# Patient Record
Sex: MALE | Race: BLACK OR AFRICAN AMERICAN | Employment: FULL TIME | URBAN - NONMETROPOLITAN AREA
[De-identification: names, ages, dates, MRNs, and addresses within clinical notes are randomized per-mention and may not be internally consistent; named-entity substitution may affect disease eponyms.]

---

## 2021-03-23 ENCOUNTER — APPOINTMENT (OUTPATIENT)
Dept: GENERAL RADIOLOGY | Age: 46
End: 2021-03-23
Attending: EMERGENCY MEDICINE
Payer: COMMERCIAL

## 2021-03-23 ENCOUNTER — HOSPITAL ENCOUNTER (EMERGENCY)
Age: 46
Discharge: HOME | End: 2021-03-23
Attending: EMERGENCY MEDICINE
Payer: COMMERCIAL

## 2021-03-23 VITALS
HEIGHT: 73 IN | OXYGEN SATURATION: 93 % | SYSTOLIC BLOOD PRESSURE: 124 MMHG | HEART RATE: 84 BPM | DIASTOLIC BLOOD PRESSURE: 75 MMHG | RESPIRATION RATE: 16 BRPM | WEIGHT: 315 LBS | BODY MASS INDEX: 41.75 KG/M2 | TEMPERATURE: 98 F

## 2021-03-23 VITALS
RESPIRATION RATE: 21 BRPM | BODY MASS INDEX: 41.75 KG/M2 | DIASTOLIC BLOOD PRESSURE: 83 MMHG | HEIGHT: 73 IN | TEMPERATURE: 98.3 F | OXYGEN SATURATION: 93 % | WEIGHT: 315 LBS | SYSTOLIC BLOOD PRESSURE: 142 MMHG | HEART RATE: 93 BPM

## 2021-03-23 DIAGNOSIS — S43.004A CLOSED DISLOCATION OF RIGHT SHOULDER, INITIAL ENCOUNTER: Primary | ICD-10-CM

## 2021-03-23 DIAGNOSIS — M25.511 PAIN IN JOINT OF RIGHT SHOULDER: Primary | ICD-10-CM

## 2021-03-23 PROCEDURE — 73030 X-RAY EXAM OF SHOULDER: CPT

## 2021-03-23 PROCEDURE — 99285 EMERGENCY DEPT VISIT HI MDM: CPT

## 2021-03-23 PROCEDURE — 73020 X-RAY EXAM OF SHOULDER: CPT

## 2021-03-23 PROCEDURE — 74011250636 HC RX REV CODE- 250/636: Performed by: EMERGENCY MEDICINE

## 2021-03-23 PROCEDURE — 96375 TX/PRO/DX INJ NEW DRUG ADDON: CPT

## 2021-03-23 PROCEDURE — 99283 EMERGENCY DEPT VISIT LOW MDM: CPT

## 2021-03-23 PROCEDURE — 74011000250 HC RX REV CODE- 250: Performed by: EMERGENCY MEDICINE

## 2021-03-23 PROCEDURE — 96374 THER/PROPH/DIAG INJ IV PUSH: CPT

## 2021-03-23 RX ORDER — TELMISARTAN 80 MG/1
80 TABLET ORAL DAILY
COMMUNITY

## 2021-03-23 RX ORDER — HYDROCHLOROTHIAZIDE 25 MG/1
25 TABLET ORAL DAILY
COMMUNITY

## 2021-03-23 RX ORDER — ONDANSETRON 2 MG/ML
4 INJECTION INTRAMUSCULAR; INTRAVENOUS
Status: COMPLETED | OUTPATIENT
Start: 2021-03-23 | End: 2021-03-23

## 2021-03-23 RX ORDER — KETAMINE HYDROCHLORIDE 10 MG/ML
100 INJECTION, SOLUTION INTRAMUSCULAR; INTRAVENOUS ONCE
Status: COMPLETED | OUTPATIENT
Start: 2021-03-23 | End: 2021-03-23

## 2021-03-23 RX ORDER — PROPOFOL 10 MG/ML
100 INJECTION, EMULSION INTRAVENOUS
Status: COMPLETED | OUTPATIENT
Start: 2021-03-23 | End: 2021-03-23

## 2021-03-23 RX ORDER — GUAIFENESIN 100 MG/5ML
81 LIQUID (ML) ORAL DAILY
COMMUNITY
End: 2021-05-25 | Stop reason: SDUPTHER

## 2021-03-23 RX ORDER — AMLODIPINE BESYLATE 5 MG/1
10 TABLET ORAL DAILY
COMMUNITY
End: 2021-05-25 | Stop reason: SDUPTHER

## 2021-03-23 RX ORDER — IBUPROFEN 800 MG/1
800 TABLET ORAL
Qty: 20 TAB | Refills: 0 | Status: SHIPPED | OUTPATIENT
Start: 2021-03-23 | End: 2021-03-30

## 2021-03-23 RX ORDER — NEBIVOLOL 5 MG/1
20 TABLET ORAL DAILY
COMMUNITY

## 2021-03-23 RX ORDER — FENTANYL CITRATE 50 UG/ML
100 INJECTION, SOLUTION INTRAMUSCULAR; INTRAVENOUS
Status: COMPLETED | OUTPATIENT
Start: 2021-03-23 | End: 2021-03-23

## 2021-03-23 RX ADMIN — FENTANYL CITRATE 100 MCG: 50 INJECTION, SOLUTION INTRAMUSCULAR; INTRAVENOUS at 04:46

## 2021-03-23 RX ADMIN — KETAMINE HYDROCHLORIDE 100 MG: 10 INJECTION INTRAMUSCULAR; INTRAVENOUS at 05:38

## 2021-03-23 RX ADMIN — PROPOFOL 100 MG: 10 INJECTION, EMULSION INTRAVENOUS at 05:38

## 2021-03-23 RX ADMIN — ONDANSETRON 4 MG: 2 INJECTION INTRAMUSCULAR; INTRAVENOUS at 04:46

## 2021-03-23 NOTE — ED PROVIDER NOTES
Patient presents for evaluation after falling over guardrail landing on his stomach and right shoulder. He reports severe right shoulder pain, worse with palpation or movement. No other complaints. Duration:  40 minutes    Intensity: severe    Modified by: palpationor movement. Past Medical History:   Diagnosis Date    Hypertension        History reviewed. No pertinent surgical history. History reviewed. No pertinent family history. Social History     Socioeconomic History    Marital status: SINGLE     Spouse name: Not on file    Number of children: Not on file    Years of education: Not on file    Highest education level: Not on file   Occupational History    Not on file   Social Needs    Financial resource strain: Not on file    Food insecurity     Worry: Not on file     Inability: Not on file    Transportation needs     Medical: Not on file     Non-medical: Not on file   Tobacco Use    Smoking status: Never Smoker    Smokeless tobacco: Never Used   Substance and Sexual Activity    Alcohol use: Never     Frequency: Never    Drug use: Never    Sexual activity: Not on file   Lifestyle    Physical activity     Days per week: Not on file     Minutes per session: Not on file    Stress: Not on file   Relationships    Social connections     Talks on phone: Not on file     Gets together: Not on file     Attends L[employer] service: Not on file     Active member of club or organization: Not on file     Attends meetings of clubs or organizations: Not on file     Relationship status: Not on file    Intimate partner violence     Fear of current or ex partner: Not on file     Emotionally abused: Not on file     Physically abused: Not on file     Forced sexual activity: Not on file   Other Topics Concern    Not on file   Social History Narrative    Not on file         ALLERGIES: Patient has no known allergies. Review of Systems   Constitutional: Negative. HENT: Negative. Eyes: Negative. Respiratory: Negative. Cardiovascular: Negative. Endocrine: Negative. Genitourinary: Negative. Musculoskeletal: Positive for arthralgias. Right shoulder pain   Skin: Negative. Allergic/Immunologic: Negative. Neurological: Negative. Hematological: Negative. Psychiatric/Behavioral: Negative. Vitals:    03/23/21 0436   BP: (!) 138/99   Pulse: (!) 108   Resp: 18   SpO2: 99%            Physical Exam  Vitals signs and nursing note reviewed. HENT:      Head: Normocephalic and atraumatic. Nose: Nose normal.   Neck:      Musculoskeletal: Normal range of motion and neck supple. Cardiovascular:      Rate and Rhythm: Normal rate and regular rhythm. Pulses: Normal pulses. Heart sounds: Normal heart sounds. Pulmonary:      Effort: Pulmonary effort is normal.      Breath sounds: Normal breath sounds. Abdominal:      General: Abdomen is flat. Bowel sounds are normal.      Palpations: Abdomen is soft. Musculoskeletal:         General: Tenderness present. No swelling. Right shoulder: He exhibits decreased range of motion and tenderness. He exhibits no crepitus. Skin:     General: Skin is warm and dry. Neurological:      General: No focal deficit present. Mental Status: He is alert and oriented to person, place, and time. Mental status is at baseline. Psychiatric:         Mood and Affect: Mood normal.         Behavior: Behavior normal.          MDM       Procedural Sedation    Date/Time: 3/23/2021 5:46 AM  Performed by: Kenya Gerard MD  Authorized by: Kenya Gerard MD     Consent:     Consent obtained:  Verbal and written    Consent given by:  Patient    Risks discussed:   Allergic reaction, inadequate sedation, respiratory compromise necessitating ventilatory assistance and intubation and prolonged sedation necessitating reversal  Indications:     Procedure performed:  Dislocation reduction    Procedure necessitating sedation performed by:  Physician performing sedation    Intended level of sedation:  Moderate (conscious sedation)  Pre-sedation assessment:     Time since last food or drink:  Last night    ASA classification: class 1 - normal, healthy patient      Neck mobility: normal      Pre-sedation assessments completed and reviewed: airway patency, anesthesia/sedation history, cardiovascular function, mental status, pain level and respiratory function    Immediate pre-procedure details:     Reviewed: vital signs    Procedure details (see MAR for exact dosages):     Preoxygenation:  Nasal cannula    Intra-procedure monitoring:  Blood pressure monitoring, continuous capnometry and continuous pulse oximetry    Intra-procedure events: none      Sedation end time:  3/23/2021 5:50 AM  Post-procedure details:     Attendance: Constant attendance by certified staff until patient recovered      Recovery: Patient returned to pre-procedure baseline      Estimated blood loss (see I/O flowsheets): no      Post-sedation assessments completed and reviewed: airway patency and mental status      Patient tolerance: Tolerated well, no immediate complications  Comments:      Right shoulder posterior dislocation reduced with traction. No complications.  Normal post reduction x ray

## 2021-03-23 NOTE — ED TRIAGE NOTES
Patient seen last noc for shoulder dislocation. This am patient states it popped out when trying to shower; then popped back in upon arrival to ED. Wants to be certain it's back in place. Dressing: bandage

## 2021-03-23 NOTE — ED NOTES
Procedure started at this time, pt on cardiac monitor w/ waveform capnography. Pt on [email or web address] via N/C. Dr. Juan Alcala RN, bUaldo Vanessa RN, and RT Chidi at bedside. Pt in no acute distress at this time.

## 2021-03-23 NOTE — ED NOTES
Pt is awake and alert at this time, pt has no complaints of pain. Pt sats 96-99% on room air. Vitals remain stable, pt answering all questions appropriately. Will continue to monitor.

## 2021-03-23 NOTE — ED PROVIDER NOTES
EMERGENCY DEPARTMENT HISTORY AND PHYSICAL EXAM      Date: 3/23/2021  Patient Name: Janak Calderon.    History of Presenting Illness     Chief Complaint   Patient presents with    Shoulder Injury       History Provided By: Patient    HPI: Janak Calderon., 50 y.o. male with a past medical history significant hypertension presents to the ED with cc of possible shoulder dislocation after reduction earlier this morning; patient states he was about the shower he abducted his arm slightly and he felt increased pain to right shoulder, patient got into the car drove to our facility and when he got here he felt the pain reduced when he moved the shoulder again; patient here to check to make sure he has not dislocated the shoulder again    There are no other complaints, changes, or physical findings at this time. PCP: UNKNOWN    Current Facility-Administered Medications on File Prior to Encounter   Medication Dose Route Frequency Provider Last Rate Last Admin    [COMPLETED] fentaNYL citrate (PF) injection 100 mcg  100 mcg IntraVENous NOW Chay Pham MD   100 mcg at 03/23/21 0446    [COMPLETED] ondansetron (ZOFRAN) injection 4 mg  4 mg IntraVENous NOW Chay Pham MD   4 mg at 03/23/21 0446    [COMPLETED] ketamine (KETALAR) 10 mg/mL injection 100 mg  100 mg IntraVENous Ann Miguel MD   100 mg at 03/23/21 0538    [COMPLETED] propofoL (DIPRIVAN) 10 mg/mL injection 100 mg  100 mg IntraVENous NOW Chay Pham MD   100 mg at 03/23/21 0154     Current Outpatient Medications on File Prior to Encounter   Medication Sig Dispense Refill    nebivoloL (BYSTOLIC) 5 mg tablet Take 20 mg by mouth daily.  amLODIPine (NORVASC) 5 mg tablet Take 10 mg by mouth daily.  telmisartan (MICARDIS) 80 mg tablet Take 80 mg by mouth daily.  hydroCHLOROthiazide (HYDRODIURIL) 25 mg tablet Take 25 mg by mouth daily.  aspirin 81 mg chewable tablet Take 81 mg by mouth daily.       ibuprofen (MOTRIN) 800 mg tablet Take 1 Tab by mouth every eight (8) hours as needed for Pain for up to 7 days. 20 Tab 0       Past History     Past Medical History:  Past Medical History:   Diagnosis Date    Hypertension        Past Surgical History:  History reviewed. No pertinent surgical history. Family History:  History reviewed. No pertinent family history. Social History:  Social History     Tobacco Use    Smoking status: Never Smoker    Smokeless tobacco: Never Used   Substance Use Topics    Alcohol use: Never     Frequency: Never    Drug use: Never       Allergies:  No Known Allergies      Review of Systems     Review of Systems   Constitutional: Negative for chills and fever. HENT: Negative for rhinorrhea and sore throat. Eyes: Negative for pain and visual disturbance. Respiratory: Negative for cough and shortness of breath. Cardiovascular: Negative for chest pain and leg swelling. Gastrointestinal: Negative for abdominal pain and vomiting. Endocrine: Negative for polydipsia and polyuria. Genitourinary: Negative for dysuria and urgency. Musculoskeletal: Negative for back pain and neck pain. Skin: Negative for color change and pallor. Neurological: Negative for weakness and numbness. Psychiatric/Behavioral: Negative. Physical Exam     Physical Exam  Vitals signs and nursing note reviewed. Constitutional:       Appearance: Normal appearance. HENT:      Head: Normocephalic and atraumatic. Mouth/Throat:      Mouth: Mucous membranes are dry. Pharynx: Oropharynx is clear. Eyes:      Extraocular Movements: Extraocular movements intact. Conjunctiva/sclera: Conjunctivae normal.      Pupils: Pupils are equal, round, and reactive to light. Neck:      Musculoskeletal: Normal range of motion and neck supple. Cardiovascular:      Rate and Rhythm: Normal rate and regular rhythm. Pulses: Normal pulses. Heart sounds: Normal heart sounds.    Pulmonary: Effort: Pulmonary effort is normal.      Breath sounds: Normal breath sounds. Abdominal:      General: Bowel sounds are normal.      Palpations: Abdomen is soft. Musculoskeletal: Normal range of motion. General: No swelling, tenderness, deformity or signs of injury. Skin:     General: Skin is warm and dry. Capillary Refill: Capillary refill takes less than 2 seconds. Neurological:      General: No focal deficit present. Mental Status: He is alert and oriented to person, place, and time. Psychiatric:         Mood and Affect: Mood normal.         Behavior: Behavior normal.         Lab and Diagnostic Study Results     Labs -   No results found for this or any previous visit (from the past 12 hour(s)). Radiologic Studies -   @lastxrresult@  CT Results  (Last 48 hours)    None        CXR Results  (Last 48 hours)    None            Medical Decision Making   - I am the first provider for this patient. - I reviewed the vital signs, available nursing notes, past medical history, past surgical history, family history and social history. - Initial assessment performed. The patients presenting problems have been discussed, and they are in agreement with the care plan formulated and outlined with them. I have encouraged them to ask questions as they arise throughout their visit. Vital Signs-Reviewed the patient's vital signs. Patient Vitals for the past 12 hrs:   Temp Pulse Resp BP SpO2   03/23/21 0958  84 16 124/75 93 %   03/23/21 0843 98 °F (36.7 °C) 87 20 137/87 96 %       Records Reviewed: Nursing Notes    The patient presents with shoulder pain with a differential diagnosis of dislocation, fracture, contusion      ED Course:          Provider Notes (Medical Decision Making): MDM       Procedures   Medical Decision Makingedical Decision Making  Performed by:  Shahzad Carrasco MD  PROCEDURES:  Procedures       Disposition   Disposition: Condition stable and improved  DC- Adult Discharges: All of the diagnostic tests were reviewed and questions answered. Diagnosis, care plan and treatment options were discussed. The patient understands the instructions and will follow up as directed. The patients results have been reviewed with them. They have been counseled regarding their diagnosis. The patient verbally convey understanding and agreement of the signs, symptoms, diagnosis, treatment and prognosis and additionally agrees to follow up as recommended with their PCP in 24 - 48 hours. They also agree with the care-plan and convey that all of their questions have been answered. I have also put together some discharge instructions for them that include: 1) educational information regarding their diagnosis, 2) how to care for their diagnosis at home, as well a 3) list of reasons why they would want to return to the ED prior to their follow-up appointment, should their condition change. Discharged    DISCHARGE PLAN:  1. Current Discharge Medication List      CONTINUE these medications which have NOT CHANGED    Details   nebivoloL (BYSTOLIC) 5 mg tablet Take 20 mg by mouth daily. amLODIPine (NORVASC) 5 mg tablet Take 10 mg by mouth daily. telmisartan (MICARDIS) 80 mg tablet Take 80 mg by mouth daily. hydroCHLOROthiazide (HYDRODIURIL) 25 mg tablet Take 25 mg by mouth daily. aspirin 81 mg chewable tablet Take 81 mg by mouth daily. ibuprofen (MOTRIN) 800 mg tablet Take 1 Tab by mouth every eight (8) hours as needed for Pain for up to 7 days. Qty: 20 Tab, Refills: 0           2. Follow-up Information     Follow up With Specialties Details Why Contact Info    Kinsey Alcala MD Orthopedic Surgery Schedule an appointment as soon as possible for a visit in 1 day  [address]  [place] [address]  [phone]          3. Return to ED if worse   4. Current Discharge Medication List            Diagnosis     Clinical Impression:   1.  Pain in joint of right shoulder        Attestations:    Meggan Johnson MD    Please note that this dictation was completed with [employer], the computer voice recognition software. Quite often unanticipated grammatical, syntax, homophones, and other interpretive errors are inadvertently transcribed by the computer software. Please disregard these errors. Please excuse any errors that have escaped final proofreading. Thank you.

## 2021-03-25 ENCOUNTER — OFFICE VISIT (OUTPATIENT)
Dept: ORTHOPEDIC SURGERY | Age: 46
End: 2021-03-25
Payer: COMMERCIAL

## 2021-03-25 VITALS — HEIGHT: 73 IN | WEIGHT: 315 LBS | BODY MASS INDEX: 41.75 KG/M2

## 2021-03-25 DIAGNOSIS — M25.511 RIGHT SHOULDER PAIN, UNSPECIFIED CHRONICITY: Primary | ICD-10-CM

## 2021-03-25 PROCEDURE — 99203 OFFICE O/P NEW LOW 30 MIN: CPT | Performed by: ORTHOPAEDIC SURGERY

## 2021-03-25 NOTE — PROGRESS NOTES
Name: Elodia Al Sr.    : 1975     Service Dept: [address] [place] [address] and Sports Medicine    Patient's Pharmacies:    [facility]/pharmacy #[id number] - [place]  [address] [place] [id number]  Phone: [phone] Fax: [phone]       Chief Complaint   Patient presents with    Shoulder Pain        Visit Vitals  Ht 6' 1\" (1.854 m)   Wt 315 lb (142.9 kg)   BMI 41.56 kg/m²      No Known Allergies   Current Outpatient Medications   Medication Sig Dispense Refill    nebivoloL (BYSTOLIC) 5 mg tablet Take 20 mg by mouth daily.  amLODIPine (NORVASC) 5 mg tablet Take 10 mg by mouth daily.  telmisartan (MICARDIS) 80 mg tablet Take 80 mg by mouth daily.  hydroCHLOROthiazide (HYDRODIURIL) 25 mg tablet Take 25 mg by mouth daily.  aspirin 81 mg chewable tablet Take 81 mg by mouth daily.  ibuprofen (MOTRIN) 800 mg tablet Take 1 Tab by mouth every eight (8) hours as needed for Pain for up to 7 days. 20 Tab 0      There is no problem list on file for this patient. Family History   Problem Relation Age of Onset    No Known Problems Mother     Diabetes Father       Social History     Socioeconomic History    Marital status:      Spouse name: Not on file    Number of children: Not on file    Years of education: Not on file    Highest education level: Not on file   Tobacco Use    Smoking status: Never Smoker    Smokeless tobacco: Never Used   Substance and Sexual Activity    Alcohol use: Never     Frequency: Never    Drug use: Never    Sexual activity: Not Currently      History reviewed. No pertinent surgical history. Past Medical History:   Diagnosis Date    Hypertension         I have reviewed and agree with [address] [place] [address] Nw and ROS and intake form in chart and the record furthermore I have reviewed prior medical record(s) regarding this patients care during this appointment.      Review of Systems:   Patient is a pleasant appearing individual, appropriately dressed, well hydrated, well nourished, who is alert, appropriately oriented for age, and in no acute distress with a normal gait and normal affect who does not appear to be in any significant pain. Physical Exam:  Right Shoulder - Positive \"empty can\" test, Grossly neurovascularly intact. Range of motion-Full passive, Active with impingement. No Point tenderness, Strength-significant weakness noted with abduction, some mild crepitation, No skin lesion are identified, No instabilty is noted, No apprehension. No Swelling. Left Shoulder - grossly neurovascularly intact. Full Range of motion, No Weakness with abduction, No Point Tenderness, No skin lesion are identified, No instabilty is noted, No apprehension. No cuts or abrasions are identified. Encounter Diagnoses     ICD-10-CM ICD-9-CM   1. Right shoulder pain, unspecified chronicity  M25.511 719.41       HPI:  The patient is here with a chief complaint of right shoulder pain, throbbing burning pain. He had a shoulder dislocation, was reduced. Pain is 6/10. ROS:  10-point review of systems is unremarkable. Assessment/Plan:  Plan at this point, my recommendation would be for ice, elevate, sling, no work, and we will get an MRI of the right shoulder. We will see him back post-MRI. As part of continued conservative pain management options the patient was advised to utilize Tylenol or OTC NSAIDS as long as it is not medically contraindicated. Return to Office: Follow-up and Dispositions    · Return for POST MRI. Scribed by Kellen Velez LPN as dictated by [facility] Sow MD.  Documentation True and Accepted Duke Sow MD

## 2021-03-25 NOTE — PATIENT INSTRUCTIONS
Shoulder Pain: Care Instructions Your Care Instructions You can hurt your shoulder by using it too much during an activity, such as fishing or baseball. It can also happen as part of the everyday wear and tear of getting older. Shoulder injuries can be slow to heal, but your shoulder should get better with time. Your doctor may recommend a sling to rest your shoulder. If you have injured your shoulder, you may need testing and treatment. Follow-up care is a key part of your treatment and safety. Be sure to make and go to all appointments, and call your doctor if you are having problems. It's also a good idea to know your test results and keep a list of the medicines you take. How can you care for yourself at home? · Take pain medicines exactly as directed. ? If the doctor gave you a prescription medicine for pain, take it as prescribed. ? If you are not taking a prescription pain medicine, ask your doctor if you can take an over-the-counter medicine. ? Do not take two or more pain medicines at the same time unless the doctor told you to. Many pain medicines contain acetaminophen, which is Tylenol. Too much acetaminophen (Tylenol) can be harmful. · If your doctor recommends that you wear a sling, use it as directed. Do not take it off before your doctor tells you to. · Put ice or a cold pack on the sore area for 10 to 20 minutes at a time. Put a thin cloth between the ice and your skin. · If there is no swelling, you can put moist heat, a heating pad, or a warm cloth on your shoulder. Some doctors suggest alternating between hot and cold. · Rest your shoulder for a few days. If your doctor recommends it, you can then begin gentle exercise of the shoulder, but do not lift anything heavy. When should you call for help? Call 911 anytime you think you may need emergency care. For example, call if: 
  · You have chest pain or pressure. This may occur with: ? Sweating. ? Shortness of breath.  
? Nausea or vomiting. ? Pain that spreads from the chest to the neck, jaw, or one or both shoulders or arms. ? Dizziness or lightheadedness. ? A fast or uneven pulse. After calling 911, chew 1 adult-strength aspirin. Wait for an ambulance. Do not try to drive yourself.  
  · Your arm or hand is cool or pale or changes color. Call your doctor now or seek immediate medical care if: 
  · You have signs of infection, such as: 
? Increased pain, swelling, warmth, or redness in your shoulder. ? Red streaks leading from a place on your shoulder. ? Pus draining from an area of your shoulder. ? Swollen lymph nodes in your neck, armpits, or groin. ? A fever. Watch closely for changes in your health, and be sure to contact your doctor if: 
  · You cannot use your shoulder.  
  · Your shoulder does not get better as expected. Where can you learn more? Go to http://www.gray.com/ Enter A542 in the search box to learn more about \"Shoulder Pain: Care Instructions. \" Current as of: March 2, 2020               Content Version: 12.6 © 5440-8531 [employer]. Care instructions adapted under license by [employer] (which disclaims liability or warranty for this information). If you have questions about a medical condition or this instruction, always ask your healthcare professional. [place] [address] any warranty or liability for your use of this information.

## 2021-03-25 NOTE — LETTER
NOTIFICATION RETURN TO WORK / SCHOOL 
 
3/25/2021 3:14 PM 
 
Mr. Santiago Garcia [address] [address] To Whom It May Concern: 
 
Jordi Hernandez Sr. is currently under the care of [address] [place], [place] [address]sun GONZALEZ. He is not able to return to work until seen post MRI. If there are questions or concerns please have the patient contact our office.  
 
 
 
Sincerely, 
 
 
Bess Rincon MD

## 2021-04-14 ENCOUNTER — HOSPITAL ENCOUNTER (OUTPATIENT)
Age: 46
Discharge: HOME | End: 2021-04-14
Payer: COMMERCIAL

## 2021-04-14 DIAGNOSIS — M25.511 RIGHT SHOULDER PAIN, UNSPECIFIED CHRONICITY: ICD-10-CM

## 2021-04-14 PROCEDURE — 73221 MRI JOINT UPR EXTREM W/O DYE: CPT

## 2021-04-20 ENCOUNTER — OFFICE VISIT (OUTPATIENT)
Dept: ORTHOPEDIC SURGERY | Age: 46
End: 2021-04-20
Payer: COMMERCIAL

## 2021-04-20 DIAGNOSIS — M25.511 RIGHT SHOULDER PAIN, UNSPECIFIED CHRONICITY: Primary | ICD-10-CM

## 2021-04-20 PROCEDURE — 99213 OFFICE O/P EST LOW 20 MIN: CPT | Performed by: ORTHOPAEDIC SURGERY

## 2021-04-20 NOTE — PATIENT INSTRUCTIONS
Shoulder Pain: Care Instructions Your Care Instructions You can hurt your shoulder by using it too much during an activity, such as fishing or baseball. It can also happen as part of the everyday wear and tear of getting older. Shoulder injuries can be slow to heal, but your shoulder should get better with time. Your doctor may recommend a sling to rest your shoulder. If you have injured your shoulder, you may need testing and treatment. Follow-up care is a key part of your treatment and safety. Be sure to make and go to all appointments, and call your doctor if you are having problems. It's also a good idea to know your test results and keep a list of the medicines you take. How can you care for yourself at home? · Take pain medicines exactly as directed. ? If the doctor gave you a prescription medicine for pain, take it as prescribed. ? If you are not taking a prescription pain medicine, ask your doctor if you can take an over-the-counter medicine. ? Do not take two or more pain medicines at the same time unless the doctor told you to. Many pain medicines contain acetaminophen, which is Tylenol. Too much acetaminophen (Tylenol) can be harmful. · If your doctor recommends that you wear a sling, use it as directed. Do not take it off before your doctor tells you to. · Put ice or a cold pack on the sore area for 10 to 20 minutes at a time. Put a thin cloth between the ice and your skin. · If there is no swelling, you can put moist heat, a heating pad, or a warm cloth on your shoulder. Some doctors suggest alternating between hot and cold. · Rest your shoulder for a few days. If your doctor recommends it, you can then begin gentle exercise of the shoulder, but do not lift anything heavy. When should you call for help? Call 911 anytime you think you may need emergency care. For example, call if: 
  · You have chest pain or pressure. This may occur with: ? Sweating. ? Shortness of breath.  
? Nausea or vomiting. ? Pain that spreads from the chest to the neck, jaw, or one or both shoulders or arms. ? Dizziness or lightheadedness. ? A fast or uneven pulse. After calling 911, chew 1 adult-strength aspirin. Wait for an ambulance. Do not try to drive yourself.  
  · Your arm or hand is cool or pale or changes color. Call your doctor now or seek immediate medical care if: 
  · You have signs of infection, such as: 
? Increased pain, swelling, warmth, or redness in your shoulder. ? Red streaks leading from a place on your shoulder. ? Pus draining from an area of your shoulder. ? Swollen lymph nodes in your neck, armpits, or groin. ? A fever. Watch closely for changes in your health, and be sure to contact your doctor if: 
  · You cannot use your shoulder.  
  · Your shoulder does not get better as expected. Where can you learn more? Go to http://www.gray.com/ Enter H630 in the search box to learn more about \"Shoulder Pain: Care Instructions. \" Current as of: November 16, 2020               Content Version: 12.8 © 9789-6081 [employer]. Care instructions adapted under license by [employer] (which disclaims liability or warranty for this information). If you have questions about a medical condition or this instruction, always ask your healthcare professional. [place] [address] any warranty or liability for your use of this information.

## 2021-04-20 NOTE — PROGRESS NOTES
Name: Edgard Rodriguez Sr.    : 1975     Service Dept: [address] [place] [address] and Sports Medicine    Patient's Pharmacies:    [facility]/pharmacy #[id number] - Philippe Sim  [address] [place] [address] [place] [address]  Phone: [phone] Fax: [phone]       Chief Complaint   Patient presents with    Shoulder Pain        There were no vitals taken for this visit. No Known Allergies   Current Outpatient Medications   Medication Sig Dispense Refill    nebivoloL (BYSTOLIC) 5 mg tablet Take 20 mg by mouth daily.  amLODIPine (NORVASC) 5 mg tablet Take 10 mg by mouth daily.  telmisartan (MICARDIS) 80 mg tablet Take 80 mg by mouth daily.  hydroCHLOROthiazide (HYDRODIURIL) 25 mg tablet Take 25 mg by mouth daily.  aspirin 81 mg chewable tablet Take 81 mg by mouth daily. There is no problem list on file for this patient. Family History   Problem Relation Age of Onset    No Known Problems Mother     Diabetes Father       Social History     Socioeconomic History    Marital status:      Spouse name: Not on file    Number of children: Not on file    Years of education: Not on file    Highest education level: Not on file   Tobacco Use    Smoking status: Never Smoker    Smokeless tobacco: Never Used   Substance and Sexual Activity    Alcohol use: Never     Frequency: Never    Drug use: Never    Sexual activity: Not Currently      History reviewed. No pertinent surgical history. Past Medical History:   Diagnosis Date    Hypertension         I have reviewed and agree with [address] [place] [address] Nw and ROS and intake form in chart and the record furthermore I have reviewed prior medical record(s) regarding this patients care during this appointment.      Review of Systems:   Patient is a pleasant appearing individual, appropriately dressed, well hydrated, well nourished, who is alert, appropriately oriented for age, and in no acute distress with a normal gait and normal affect who does not appear to be in any significant pain. Physical Exam:  Right Shoulder - Grossly neurovascularly intact. Range of motion-Full passive, Active with impingement, Anterior apprehension, No Point tenderness, Strength-weakness with abduction, palpable popping felt on exam, No skin lesion are identified, No instabilty is noted, positive apprehension. No Swelling. Left Shoulder - Grossly neurovascularly intact, Full Range of motion, No point tenderness, No weakness, No skin lesions, No Instability, No apprehension, No swelling. Encounter Diagnoses     ICD-10-CM ICD-9-CM   1. Right shoulder pain, unspecified chronicity  M25.511 719.41       HPI:  The patient is here with a chief complaint of right shoulder pain, sharp, throbbing pain. It is a lot better. Pain is  5/10. ROS:  10-point review of systems is positive for nighttime pain and stiffness. X-rays of the right shoulder are unremarkable. MRI is positive for large bony Bankart lesion. Continues to have difficulty with it. Assessment/Plan:  1. 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 Plan at this point, consider treatment. I will see the patient back in 2 weeks and at that point if he still continues to have difficulty, we may get a CAT scan to assess this bony Bankart and go from there. If it gets worse, he is to give me a call. As part of continued conservative pain management options the patient was advised to utilize Tylenol or OTC NSAIDS as long as it is not medically contraindicated. Return to Office: Follow-up and Dispositions    · Return in about 2 weeks (around 5/4/2021). Scribed by Tosha Hsu LPN as dictated by [facility] SUPA Manning MD.  Documentation True and Accepted Duke Manning MD

## 2021-04-20 NOTE — LETTER
NOTIFICATION RETURN TO WORK / SCHOOL 
 
4/20/2021 8:44 AM 
 
Mr. Santiago Garcia [address] [address] To Whom It May Concern: 
 
Kyra Christian Sr. is currently under the care of [address] [place] [address] LISA. He will remain out of work until further notice. If there are questions or concerns please have the patient contact our office.  
 
 
 
Sincerely, 
 
 
Lisa Craven MD

## 2021-05-04 ENCOUNTER — OFFICE VISIT (OUTPATIENT)
Dept: ORTHOPEDIC SURGERY | Age: 46
End: 2021-05-04
Payer: COMMERCIAL

## 2021-05-04 DIAGNOSIS — M25.511 RIGHT SHOULDER PAIN, UNSPECIFIED CHRONICITY: Primary | ICD-10-CM

## 2021-05-04 PROCEDURE — 99213 OFFICE O/P EST LOW 20 MIN: CPT | Performed by: ORTHOPAEDIC SURGERY

## 2021-05-04 NOTE — PATIENT INSTRUCTIONS
Shoulder Pain: Care Instructions Your Care Instructions You can hurt your shoulder by using it too much during an activity, such as fishing or baseball. It can also happen as part of the everyday wear and tear of getting older. Shoulder injuries can be slow to heal, but your shoulder should get better with time. Your doctor may recommend a sling to rest your shoulder. If you have injured your shoulder, you may need testing and treatment. Follow-up care is a key part of your treatment and safety. Be sure to make and go to all appointments, and call your doctor if you are having problems. It's also a good idea to know your test results and keep a list of the medicines you take. How can you care for yourself at home? · Take pain medicines exactly as directed. ? If the doctor gave you a prescription medicine for pain, take it as prescribed. ? If you are not taking a prescription pain medicine, ask your doctor if you can take an over-the-counter medicine. ? Do not take two or more pain medicines at the same time unless the doctor told you to. Many pain medicines contain acetaminophen, which is Tylenol. Too much acetaminophen (Tylenol) can be harmful. · If your doctor recommends that you wear a sling, use it as directed. Do not take it off before your doctor tells you to. · Put ice or a cold pack on the sore area for 10 to 20 minutes at a time. Put a thin cloth between the ice and your skin. · If there is no swelling, you can put moist heat, a heating pad, or a warm cloth on your shoulder. Some doctors suggest alternating between hot and cold. · Rest your shoulder for a few days. If your doctor recommends it, you can then begin gentle exercise of the shoulder, but do not lift anything heavy. When should you call for help? Call 911 anytime you think you may need emergency care. For example, call if: 
  · You have chest pain or pressure. This may occur with: ? Sweating. ? Shortness of breath.  
? Nausea or vomiting. ? Pain that spreads from the chest to the neck, jaw, or one or both shoulders or arms. ? Dizziness or lightheadedness. ? A fast or uneven pulse. After calling 911, chew 1 adult-strength aspirin. Wait for an ambulance. Do not try to drive yourself.  
  · Your arm or hand is cool or pale or changes color. Call your doctor now or seek immediate medical care if: 
  · You have signs of infection, such as: 
? Increased pain, swelling, warmth, or redness in your shoulder. ? Red streaks leading from a place on your shoulder. ? Pus draining from an area of your shoulder. ? Swollen lymph nodes in your neck, armpits, or groin. ? A fever. Watch closely for changes in your health, and be sure to contact your doctor if: 
  · You cannot use your shoulder.  
  · Your shoulder does not get better as expected. Where can you learn more? Go to http://www.gray.com/ Enter L820 in the search box to learn more about \"Shoulder Pain: Care Instructions. \" Current as of: November 16, 2020               Content Version: 12.8 © 7725-2626 [employer]. Care instructions adapted under license by [employer] (which disclaims liability or warranty for this information). If you have questions about a medical condition or this instruction, always ask your healthcare professional. [place] [address] any warranty or liability for your use of this information.

## 2021-05-04 NOTE — PROGRESS NOTES
Name: Anita Olivas Sr.    : 1975     Service Dept: [address] [place] [address] and Sports Medicine    Patient's Pharmacies:    [facility]/pharmacy #[id number] - You RodriguezPhilippe kruger  [address] [place] [address]  Phone: [phone] Fax: [phone]       Chief Complaint   Patient presents with    Shoulder Pain        There were no vitals taken for this visit. No Known Allergies   Current Outpatient Medications   Medication Sig Dispense Refill    nebivoloL (BYSTOLIC) 5 mg tablet Take 20 mg by mouth daily.  amLODIPine (NORVASC) 5 mg tablet Take 10 mg by mouth daily.  telmisartan (MICARDIS) 80 mg tablet Take 80 mg by mouth daily.  hydroCHLOROthiazide (HYDRODIURIL) 25 mg tablet Take 25 mg by mouth daily.  aspirin 81 mg chewable tablet Take 81 mg by mouth daily. There is no problem list on file for this patient. Family History   Problem Relation Age of Onset    No Known Problems Mother     Diabetes Father       Social History     Socioeconomic History    Marital status:      Spouse name: Not on file    Number of children: Not on file    Years of education: Not on file    Highest education level: Not on file   Tobacco Use    Smoking status: Never Smoker    Smokeless tobacco: Never Used   Substance and Sexual Activity    Alcohol use: Never     Frequency: Never    Drug use: Never    Sexual activity: Not Currently      History reviewed. No pertinent surgical history. Past Medical History:   Diagnosis Date    Hypertension         I have reviewed and agree with [address] [place] [address] Nw and ROS and intake form in chart and the record furthermore I have reviewed prior medical record(s) regarding this patients care during this appointment.      Review of Systems:   Patient is a pleasant appearing individual, appropriately dressed, well hydrated, well nourished, who is alert, appropriately oriented for age, and in no acute distress with a normal gait and normal affect who does not appear to be in any significant pain. Physical Exam:  Right Shoulder - Positive \"empty can\" test, Grossly neurovascularly intact. Range of motion-Full passive, Active with impingement. No Point tenderness, Strength-significant weakness noted with abduction, some mild crepitation, No skin lesion are identified, No instabilty is noted, No apprehension. No Swelling. Left Shoulder - grossly neurovascularly intact. Full Range of motion, No Weakness with abduction, No Point Tenderness, No skin lesion are identified, No instabilty is noted, No apprehension. No cuts or abrasions are identified. Encounter Diagnoses     ICD-10-CM ICD-9-CM   1. Right shoulder pain, unspecified chronicity  M25.511 719.41       HPI:  The patient is here with a chief complaint of right shoulder pain, status post shoulder dislocation. Post MRI shows that he has got a bony Bankart. No evidence of any rotator cuff tear. Continues to have little bit of difficulty, but not significantly painful. Assessment/Plan:  Plan at this point, my recommendation would be for ice, elevate, antiinflammatories. We will see him back in 3 to 4 weeks. We want to address his instability at some point if he continues to have it, but we are going to try just conservative treatment, see how he does, and go from there. If it gets worse, he is to give me a call. As part of continued conservative pain management options the patient was advised to utilize Tylenol or OTC NSAIDS as long as it is not medically contraindicated. Return to Office: Follow-up and Dispositions    · Return in about 3 weeks (around 5/25/2021). Scribed by Amber To LPN as dictated by [facility] SUPA Garcia MD.  Documentation True and Accepted Duke Garcia MD

## 2021-05-04 NOTE — LETTER
NOTIFICATION RETURN TO WORK / SCHOOL 
 
5/4/2021 8:36 AM 
 
Mr. Santiago Garcia [address] [address] To Whom It May Concern: 
 
Nicolas Ky Betancourt is currently under the care of [address] [place] [address] LISA. He will remain out of work until further notice. If there are questions or concerns please have the patient contact our office.  
 
 
 
Sincerely, 
 
 
Keny Brar MD

## 2021-05-25 ENCOUNTER — OFFICE VISIT (OUTPATIENT)
Dept: ORTHOPEDIC SURGERY | Age: 46
End: 2021-05-25
Payer: COMMERCIAL

## 2021-05-25 DIAGNOSIS — M25.511 RIGHT SHOULDER PAIN, UNSPECIFIED CHRONICITY: Primary | ICD-10-CM

## 2021-05-25 PROCEDURE — 99213 OFFICE O/P EST LOW 20 MIN: CPT | Performed by: ORTHOPAEDIC SURGERY

## 2021-05-25 RX ORDER — AMLODIPINE BESYLATE 10 MG/1
TABLET ORAL
COMMUNITY
Start: 2021-05-13

## 2021-05-25 RX ORDER — CELECOXIB 200 MG/1
CAPSULE ORAL
COMMUNITY
Start: 2021-05-24

## 2021-05-25 RX ORDER — ASPIRIN 81 MG/1
TABLET ORAL
COMMUNITY
Start: 2021-05-08

## 2021-05-25 RX ORDER — FLUTICASONE PROPIONATE 50 MCG
SPRAY, SUSPENSION (ML) NASAL
COMMUNITY
Start: 2021-05-13

## 2021-05-25 NOTE — PROGRESS NOTES
Name: Diania Pallas Sr.    : 1975     Service Dept: [address] [place] [address] and Sports Medicine    Patient's Pharmacies:    [facility]/pharmacy #[id number] - Teri KristianPhilippe atwood  [address] [place] [address]  Phone: [phone] Fax: [phone]       Chief Complaint   Patient presents with    Shoulder Pain        There were no vitals taken for this visit. No Known Allergies   Current Outpatient Medications   Medication Sig Dispense Refill    celecoxib (CELEBREX) 200 mg capsule       fluticasone propionate (FLONASE) 50 mcg/actuation nasal spray USE 1 SPRAY IN BOTH NOSTRILS TWICE A DAY      amLODIPine (NORVASC) 10 mg tablet 0NE TAB BY MOUTH EVERY AM FOR HIGH BLOOD PRESSURE   THIS IS A DOSE INCREASE      aspirin delayed-release 81 mg tablet TAKE 1 TABLET BY MOUTH EVERY DAY      nebivoloL (BYSTOLIC) 5 mg tablet Take 20 mg by mouth daily.  telmisartan (MICARDIS) 80 mg tablet Take 80 mg by mouth daily.  hydroCHLOROthiazide (HYDRODIURIL) 25 mg tablet Take 25 mg by mouth daily. There is no problem list on file for this patient.      Family History   Problem Relation Age of Onset    No Known Problems Mother     Diabetes Father       Social History     Socioeconomic History    Marital status:      Spouse name: Not on file    Number of children: Not on file    Years of education: Not on file    Highest education level: Not on file   Tobacco Use    Smoking status: Never Smoker    Smokeless tobacco: Never Used   Vaping Use    Vaping Use: Never used   Substance and Sexual Activity    Alcohol use: Never    Drug use: Never    Sexual activity: Not Currently     Social Determinants of Health     Financial Resource Strain:     Difficulty of Paying Living Expenses:    Food Insecurity:     Worried About Running Out of Food in the Last Year:     Radha of Food in the Last Year:    Transportation Needs:  Lack of Transportation (Medical):  Lack of Transportation (Non-Medical):    Physical Activity:     Days of Exercise per Week:     Minutes of Exercise per Session:    Stress:     Feeling of Stress :    Social Connections:     Frequency of Communication with Friends and Family:     Frequency of Social Gatherings with Friends and Family:     Attends Religion Services:     Active Member of Clubs or Organizations:     Attends Club or Organization Meetings:     Marital Status:       History reviewed. No pertinent surgical history. Past Medical History:   Diagnosis Date    Hypertension         I have reviewed and agree with [facility] N[address] and ROS and intake form in chart and the record furthermore I have reviewed prior medical record(s) regarding this patients care during this appointment. Review of Systems:   Patient is a pleasant appearing individual, appropriately dressed, well hydrated, well nourished, who is alert, appropriately oriented for age, and in no acute distress with a normal gait and normal affect who does not appear to be in any significant pain. Physical Exam:  Right Shoulder - Positive \"empty can\" test, Grossly neurovascularly intact. Range of motion-Full passive, Active with impingement. No Point tenderness, Strength-significant weakness noted with abduction, some mild crepitation, No skin lesion are identified, No instabilty is noted, No apprehension. No Swelling. Left Shoulder - grossly neurovascularly intact. Full Range of motion, No Weakness with abduction, No Point Tenderness, No skin lesion are identified, No instabilty is noted, No apprehension. No cuts or abrasions are identified. Encounter Diagnoses     ICD-10-CM ICD-9-CM   1. Right shoulder pain, unspecified chronicity  M25.511 719.41       HPI:  The patient is here with a chief complaint of right shoulder pain, sharp throbbing pain. It is a lot better. Pain is 6/10. ROS:  10-point review of systems is unremarkable. He is diagnosed with bony Bankart's. Assessment/Plan:  Plan at this point, we are going to go ahead and start physical therapy for him, see him back in 6 weeks, and return to work in 2 weeks and go from there. As part of continued conservative pain management options the patient was advised to utilize Tylenol or OTC NSAIDS as long as it is not medically contraindicated. Return to Office: Follow-up and Dispositions    · Return in about 6 weeks (around 7/6/2021). Scribed by Racheal Brooks LPN as dictated by [facility] SUPA Barnett MD.  Documentation True and Accepted [facility] SUPA Barnett MD

## 2021-05-25 NOTE — PATIENT INSTRUCTIONS
Shoulder Pain: Care Instructions Your Care Instructions You can hurt your shoulder by using it too much during an activity, such as fishing or baseball. It can also happen as part of the everyday wear and tear of getting older. Shoulder injuries can be slow to heal, but your shoulder should get better with time. Your doctor may recommend a sling to rest your shoulder. If you have injured your shoulder, you may need testing and treatment. Follow-up care is a key part of your treatment and safety. Be sure to make and go to all appointments, and call your doctor if you are having problems. It's also a good idea to know your test results and keep a list of the medicines you take. How can you care for yourself at home? · Take pain medicines exactly as directed. ? If the doctor gave you a prescription medicine for pain, take it as prescribed. ? If you are not taking a prescription pain medicine, ask your doctor if you can take an over-the-counter medicine. ? Do not take two or more pain medicines at the same time unless the doctor told you to. Many pain medicines contain acetaminophen, which is Tylenol. Too much acetaminophen (Tylenol) can be harmful. · If your doctor recommends that you wear a sling, use it as directed. Do not take it off before your doctor tells you to. · Put ice or a cold pack on the sore area for 10 to 20 minutes at a time. Put a thin cloth between the ice and your skin. · If there is no swelling, you can put moist heat, a heating pad, or a warm cloth on your shoulder. Some doctors suggest alternating between hot and cold. · Rest your shoulder for a few days. If your doctor recommends it, you can then begin gentle exercise of the shoulder, but do not lift anything heavy. When should you call for help? Call 911 anytime you think you may need emergency care. For example, call if: 
  · You have chest pain or pressure. This may occur with: ? Sweating. ? Shortness of breath.  
? Nausea or vomiting. ? Pain that spreads from the chest to the neck, jaw, or one or both shoulders or arms. ? Dizziness or lightheadedness. ? A fast or uneven pulse. After calling 911, chew 1 adult-strength aspirin. Wait for an ambulance. Do not try to drive yourself.  
  · Your arm or hand is cool or pale or changes color. Call your doctor now or seek immediate medical care if: 
  · You have signs of infection, such as: 
? Increased pain, swelling, warmth, or redness in your shoulder. ? Red streaks leading from a place on your shoulder. ? Pus draining from an area of your shoulder. ? Swollen lymph nodes in your neck, armpits, or groin. ? A fever. Watch closely for changes in your health, and be sure to contact your doctor if: 
  · You cannot use your shoulder.  
  · Your shoulder does not get better as expected. Where can you learn more? Go to http://www.gray.com/ Enter Y781 in the search box to learn more about \"Shoulder Pain: Care Instructions. \" Current as of: November 16, 2020               Content Version: 12.8 © 0886-6726 [employer]. Care instructions adapted under license by [employer] (which disclaims liability or warranty for this information). If you have questions about a medical condition or this instruction, always ask your healthcare professional. [place] [address] any warranty or liability for your use of this information.

## 2021-05-25 NOTE — LETTER
NOTIFICATION RETURN TO WORK / SCHOOL 
 
5/25/2021 8:27 AM 
 
Mr. Santiago Garcia [address] [address] To Whom It May Concern: 
 
Johan Garsia Sr. is currently under the care of [address] [place] [address]. He will return to work on 6/14/21 with no restrictions. If there are questions or concerns please have the patient contact our office.  
 
 
 
Sincerely, 
 
 
Sahra Ford MD

## 2021-07-07 ENCOUNTER — OFFICE VISIT (OUTPATIENT)
Dept: ORTHOPEDIC SURGERY | Age: 46
End: 2021-07-07
Payer: COMMERCIAL

## 2021-07-07 DIAGNOSIS — M75.51 BURSITIS OF RIGHT SHOULDER: Primary | ICD-10-CM

## 2021-07-07 PROCEDURE — 99213 OFFICE O/P EST LOW 20 MIN: CPT | Performed by: ORTHOPAEDIC SURGERY

## 2021-07-07 RX ORDER — ATORVASTATIN CALCIUM 40 MG/1
TABLET, FILM COATED ORAL
COMMUNITY
Start: 2021-06-04

## 2021-07-07 RX ORDER — NEBIVOLOL HYDROCHLORIDE 20 MG/1
TABLET ORAL
COMMUNITY
Start: 2021-07-05

## 2021-07-07 NOTE — PATIENT INSTRUCTIONS
Shoulder Pain: Care Instructions  Your Care Instructions     You can hurt your shoulder by using it too much during an activity, such as fishing or baseball. It can also happen as part of the everyday wear and tear of getting older. Shoulder injuries can be slow to heal, but your shoulder should get better with time. Your doctor may recommend a sling to rest your shoulder. If you have injured your shoulder, you may need testing and treatment. Follow-up care is a key part of your treatment and safety. Be sure to make and go to all appointments, and call your doctor if you are having problems. It's also a good idea to know your test results and keep a list of the medicines you take. How can you care for yourself at home? · Take pain medicines exactly as directed. ? If the doctor gave you a prescription medicine for pain, take it as prescribed. ? If you are not taking a prescription pain medicine, ask your doctor if you can take an over-the-counter medicine. ? Do not take two or more pain medicines at the same time unless the doctor told you to. Many pain medicines contain acetaminophen, which is Tylenol. Too much acetaminophen (Tylenol) can be harmful. · If your doctor recommends that you wear a sling, use it as directed. Do not take it off before your doctor tells you to. · Put ice or a cold pack on the sore area for 10 to 20 minutes at a time. Put a thin cloth between the ice and your skin. · If there is no swelling, you can put moist heat, a heating pad, or a warm cloth on your shoulder. Some doctors suggest alternating between hot and cold. · Rest your shoulder for a few days. If your doctor recommends it, you can then begin gentle exercise of the shoulder, but do not lift anything heavy. When should you call for help? Call 911 anytime you think you may need emergency care. For example, call if:    · You have chest pain or pressure. This may occur with:  ? Sweating. ?  Shortness of breath. ? Nausea or vomiting. ? Pain that spreads from the chest to the neck, jaw, or one or both shoulders or arms. ? Dizziness or lightheadedness. ? A fast or uneven pulse. After calling 911, chew 1 adult-strength aspirin. Wait for an ambulance. Do not try to drive yourself.     · Your arm or hand is cool or pale or changes color. Call your doctor now or seek immediate medical care if:    · You have signs of infection, such as:  ? Increased pain, swelling, warmth, or redness in your shoulder. ? Red streaks leading from a place on your shoulder. ? Pus draining from an area of your shoulder. ? Swollen lymph nodes in your neck, armpits, or groin. ? A fever. Watch closely for changes in your health, and be sure to contact your doctor if:    · You cannot use your shoulder.     · Your shoulder does not get better as expected. Where can you learn more? Go to http://www.saunders.com/  Enter H996 in the search box to learn more about \"Shoulder Pain: Care Instructions. \"  Current as of: November 16, 2020               Content Version: 12.8  © 9039-0515 [employer]. Care instructions adapted under license by [employer] (which disclaims liability or warranty for this information). If you have questions about a medical condition or this instruction, always ask your healthcare professional. [address] any warranty or liability for your use of this information.

## 2021-07-07 NOTE — PROGRESS NOTES
Name: Libra Knowles Sr.    : 1975     Service Dept: [address] [place] [address] and Sports Medicine    Patient's Pharmacies:    [facility]/pharmacy #[id number] - Philippe Burnett  [address] [place] [id number]  Phone: [phone] Fax: [phone]       Chief Complaint   Patient presents with    Shoulder Pain        There were no vitals taken for this visit. No Known Allergies   Current Outpatient Medications   Medication Sig Dispense Refill    atorvastatin (LIPITOR) 40 mg tablet       Bystolic 20 mg tablet       celecoxib (CELEBREX) 200 mg capsule       fluticasone propionate (FLONASE) 50 mcg/actuation nasal spray USE 1 SPRAY IN BOTH NOSTRILS TWICE A DAY      amLODIPine (NORVASC) 10 mg tablet 0NE TAB BY MOUTH EVERY AM FOR HIGH BLOOD PRESSURE   THIS IS A DOSE INCREASE      aspirin delayed-release 81 mg tablet TAKE 1 TABLET BY MOUTH EVERY DAY      nebivoloL (BYSTOLIC) 5 mg tablet Take 20 mg by mouth daily.  telmisartan (MICARDIS) 80 mg tablet Take 80 mg by mouth daily.  hydroCHLOROthiazide (HYDRODIURIL) 25 mg tablet Take 25 mg by mouth daily. There is no problem list on file for this patient.      Family History   Problem Relation Age of Onset    No Known Problems Mother     Diabetes Father       Social History     Socioeconomic History    Marital status:      Spouse name: Not on file    Number of children: Not on file    Years of education: Not on file    Highest education level: Not on file   Tobacco Use    Smoking status: Never Smoker    Smokeless tobacco: Never Used   Vaping Use    Vaping Use: Never used   Substance and Sexual Activity    Alcohol use: Never    Drug use: Never    Sexual activity: Not Currently     Social Determinants of Health     Financial Resource Strain:     Difficulty of Paying Living Expenses:    Food Insecurity:     Worried About Running Out of Food in the Last Year:  Ran Out of Food in the Last Year:    Transportation Needs:     Lack of Transportation (Medical):  Lack of Transportation (Non-Medical):    Physical Activity:     Days of Exercise per Week:     Minutes of Exercise per Session:    Stress:     Feeling of Stress :    Social Connections:     Frequency of Communication with Friends and Family:     Frequency of Social Gatherings with Friends and Family:     Attends [employer] Services:     Active Member of Clubs or Organizations:     Attends Club or Organization Meetings:     Marital Status:       History reviewed. No pertinent surgical history. Past Medical History:   Diagnosis Date    Hypertension         I have reviewed and agree with [address] [place] [address] Nw and ROS and intake form in chart and the record furthermore I have reviewed prior medical record(s) regarding this patients care during this appointment. Review of Systems:   Patient is a pleasant appearing individual, appropriately dressed, well hydrated, well nourished, who is alert, appropriately oriented for age, and in no acute distress with a normal gait and normal affect who does not appear to be in any significant pain. Physical Exam:  Right Shoulder - Grossly neurovascularly intact. Range of motion-Full passive, Active with impingement. No Point tenderness, Strength-weakness with abduction, some mild crepitation, No skin lesion are identified, No instabilty is noted, No apprehension. No Swelling. Left Shoulder - Grossly neurovascularly intact, Full Range of motion, No point tenderness, No weakness, No skin lesions, No Instability, No apprehension, No swelling. Encounter Diagnoses     ICD-10-CM ICD-9-CM   1. Bursitis of right shoulder  M75.51 726.10       HPI:  The patient is here with a chief complaint of right shoulder pain, diagnosed with bony Bankart, status post fall in March. We have been treating conservatively. Feeling better. Pain is 0/10.     Assessment/Plan:  Plan at this point, activities as tolerated, weightbearing started, no restrictions. We will see the patient back as needed and go from there. As part of continued conservative pain management options the patient was advised to utilize Tylenol or OTC NSAIDS as long as it is not medically contraindicated. Return to Office: Follow-up and Dispositions    · Return for PRN. Scribed by Jose Vega MD as dictated by Oscar Rogers. Leny Mora MD.  Documentation True and Accepted Duke Mora MD

## 2023-02-23 ENCOUNTER — OFFICE VISIT (OUTPATIENT)
Age: 48
End: 2023-02-23

## 2023-02-23 VITALS — TEMPERATURE: 97.8 F | BODY MASS INDEX: 41.75 KG/M2 | WEIGHT: 315 LBS | HEIGHT: 73 IN

## 2023-02-23 DIAGNOSIS — M65.342 TRIGGER RING FINGER OF LEFT HAND: ICD-10-CM

## 2023-02-23 DIAGNOSIS — R20.2 NUMBNESS AND TINGLING IN BOTH HANDS: Primary | ICD-10-CM

## 2023-02-23 DIAGNOSIS — M65.341 TRIGGER RING FINGER OF RIGHT HAND: ICD-10-CM

## 2023-02-23 DIAGNOSIS — R20.0 NUMBNESS AND TINGLING IN BOTH HANDS: Primary | ICD-10-CM

## 2023-02-23 NOTE — PROGRESS NOTES
Tex Emmanuel is a 50 y.o. male right handed. Worker's Compensation and legal considerations: none    Chief Complaint   Patient presents with    Hand Pain     Bilateral hand pain     AMB PAIN ASSESSMENT 2/23/2023   Location of Pain Hand   Location Modifiers Right;Left   Severity of Pain 2   Duration of Pain Persistent   Frequency of Pain Several times daily   Limiting Behavior Yes       HPI: Patient presents today with complaints of bilateral hand numbness and tingling left worse than right. He also reports locking in his bilateral ring fingers. Date of onset: Approximately November 2022  Injury: No  Prior Treatment:  No    ROS: Review of Systems - General ROS: negative except HPI    Past Medical History:   Diagnosis Date    Hypertension        History reviewed. No pertinent surgical history.      Current Outpatient Medications   Medication Sig Dispense Refill    amLODIPine (NORVASC) 10 MG tablet 0NE TAB BY MOUTH EVERY AM FOR HIGH BLOOD PRESSURE   THIS IS A DOSE INCREASE      aspirin 81 MG EC tablet TAKE 1 TABLET BY MOUTH EVERY DAY      atorvastatin (LIPITOR) 40 MG tablet ceived the following from [facility]: Outside name: atorvastatin (LIPITOR) 40 mg tablet      celecoxib (CELEBREX) 200 MG capsule ceived the following from [facility]: Outside name: celecoxib (CELEBREX) 200 mg capsule      fluticasone (FLONASE) 50 MCG/ACT nasal spray USE 1 SPRAY IN BOTH NOSTRILS TWICE A DAY      hydroCHLOROthiazide (HYDRODIURIL) 25 MG tablet Take 25 mg by mouth daily      nebivolol (BYSTOLIC) 20 MG TABS tablet ceived the following from [facility]: Outside name: Bystolic 20 mg tablet      nebivolol (BYSTOLIC) 5 MG tablet Take 20 mg by mouth daily      telmisartan (MICARDIS) 80 MG tablet Take 80 mg by mouth daily       Current Facility-Administered Medications   Medication Dose Route Frequency Provider Last Rate Last Admin    triamcinolone acetonide (KENALOG) injection 5 mg  5 mg Intra-LESional Once Michael DEBBIE Parks DO        triamcinolone acetonide (KENALOG) injection 5 mg  5 mg Intra-LESional Once Michael DEBBIE DO Charly           No Known Allergies      Temp 97.8 °F (36.6 °C) (Temporal)   Ht 6' 1\" (1.854 m)   Wt (!) 326 lb (147.9 kg)   BMI 43.01 kg/m²   Physical Exam  Vitals and nursing note reviewed. Constitutional:       General: He is not in acute distress. Appearance: Normal appearance. He is not ill-appearing. Cardiovascular:      Pulses: Normal pulses. Pulmonary:      Effort: Pulmonary effort is normal. No respiratory distress. Musculoskeletal:         General: Tenderness present. No swelling, deformity or signs of injury. Normal range of motion. Cervical back: Normal range of motion and neck supple. Right lower leg: No edema. Left lower leg: No edema. Skin:     General: Skin is warm and dry. Capillary Refill: Capillary refill takes less than 2 seconds. Findings: No bruising or erythema. Neurological:      General: No focal deficit present. Mental Status: He is alert and oriented to person, place, and time. Psychiatric:         Mood and Affect: Mood normal.         Behavior: Behavior normal.        Hand:    Examination L Digit(s) R Digit(s)   1st CMC Tenderness -  -    1st CMC Grind -  -    Roman Nodes -  -    Heberden Nodes -  -    A1 Pulley Tenderness + RF +    Triggering +  +    UCL Instability -  -    RCL Instability -  -    Lateral Stress Pain -  -    Palmar Cords -  -    Tabletop test -  -    Garrod's Pads -  -     Strength       Pinch Strength         ROM: Full    NEUROVASCULAR exam inconclusive    Examination L R Examination L R   Carpal Comp. - - Pronator Comp. - -   Carpal Tinel - - Pronator Tinel - -   Phalen's - - Pronator Stress - -   Cubital Comp. - - Guyon Comp. - -   Cubital Tinel - - Guyon Tinel - -   Elbow Hyperflexion - - Adson's - -   Spurling's - - SC Comp.  - -   PCB Median abn - - SC Tinel - - Radial Tinel - - IC Comp. - -   Digital Tinel - - IC Tinel - -   Radial 2-Pt WNL WNL Ulnar 2-Pt WNL WNL     Radial Pulse: 2+  Capillary Refill: < 2 sec  Nikos: Not Performed  Digital Nikos: Not Performed      Imaging:     None indicated      Impression     Diagnosis Orders   1. Numbness and tingling in both hands  NCV WITH EMG BILATERAL UPPER EXTREMITIES      2. Trigger ring finger of left hand  INJECT TENDON SHEATH/LIGAMENT    triamcinolone acetonide (KENALOG) injection 5 mg      3. Trigger ring finger of right hand  INJECT TENDON SHEATH/LIGAMENT    triamcinolone acetonide (KENALOG) injection 5 mg            Plan:     Bilateral upper extremity EMGs to evaluate for peripheral mononeuropathies. Bilateral ring trigger finger injections. Return for EMG review. Plan was reviewed with patient, who verbalized agreement and understanding of the plan    [facility], [address]  100 [facility]   OFFICE PROCEDURE PROGRESS NOTE        Chart reviewed for the following:   Michael PEREZ DO, have reviewed the History, Physical and updated the Allergic reactions for [address] performed immediately prior to start of procedure:   Michael PEREZ DO, have performed the following reviews on [address] [place] [address] prior to the start of the procedure:            * Patient was identified by name and date of birth   * Agreement on procedure being performed was verified  * Risks and Benefits explained to the patient  * Procedure site verified and marked as necessary  * Patient was positioned for comfort  * Consent was signed and verified     Time: 13:28      Date of procedure: 2/23/2023    Procedure performed by:   Joel Peñaloza DO    Provider assisted by: Omid Joyner MA    Patient assisted by: self    How tolerated by patient: tolerated    Post Procedural Pain Scale:0    Comments: none    Procedure:  After consent was obtained, using sterile technique the bilateral ring fingers was prepped. Local anesthetic used: 1% Lidocaine Kenalog 5 mg x2 and was then injected and the needle withdrawn. The procedure was well tolerated. The patient is asked to continue to rest the area for a few more days before resuming regular activities. It may be more painful for the first 1-2 days. Watch for fever, or increased swelling or persistent pain in the joint. Call or return to clinic prn if such symptoms occur or there is failure to improve as anticipated. Note: This note was completed using voice recognition software.   Any typographical/name errors or mistakes are unintentional.

## 2023-03-10 ENCOUNTER — PROCEDURE VISIT (OUTPATIENT)
Age: 48
End: 2023-03-10

## 2023-03-10 VITALS
SYSTOLIC BLOOD PRESSURE: 128 MMHG | RESPIRATION RATE: 18 BRPM | WEIGHT: 315 LBS | HEART RATE: 70 BPM | BODY MASS INDEX: 41.75 KG/M2 | HEIGHT: 73 IN | DIASTOLIC BLOOD PRESSURE: 75 MMHG | TEMPERATURE: 98.2 F | OXYGEN SATURATION: 95 %

## 2023-03-10 DIAGNOSIS — R20.0 NUMBNESS AND TINGLING OF BOTH UPPER EXTREMITIES: Primary | ICD-10-CM

## 2023-03-10 DIAGNOSIS — R20.2 NUMBNESS AND TINGLING OF BOTH UPPER EXTREMITIES: Primary | ICD-10-CM

## 2023-03-10 DIAGNOSIS — G56.03 BILATERAL CARPAL TUNNEL SYNDROME: ICD-10-CM

## 2023-03-10 DIAGNOSIS — R94.131 ABNORMAL EMG: ICD-10-CM

## 2023-03-10 RX ORDER — LOSARTAN POTASSIUM AND HYDROCHLOROTHIAZIDE 12.5; 1 MG/1; MG/1
1 TABLET ORAL DAILY
COMMUNITY

## 2023-03-10 RX ORDER — METFORMIN HYDROCHLORIDE 500 MG/1
TABLET, EXTENDED RELEASE ORAL
COMMUNITY
Start: 2020-04-22

## 2023-03-10 ASSESSMENT — PATIENT HEALTH QUESTIONNAIRE - PHQ9
SUM OF ALL RESPONSES TO PHQ9 QUESTIONS 1 & 2: 0
SUM OF ALL RESPONSES TO PHQ QUESTIONS 1-9: 0
SUM OF ALL RESPONSES TO PHQ QUESTIONS 1-9: 0
2. FEELING DOWN, DEPRESSED OR HOPELESS: 0
SUM OF ALL RESPONSES TO PHQ QUESTIONS 1-9: 0
1. LITTLE INTEREST OR PLEASURE IN DOING THINGS: 0
SUM OF ALL RESPONSES TO PHQ QUESTIONS 1-9: 0

## 2023-03-10 NOTE — PROGRESS NOTES
[address]  [address]  [address]  Phone: [phone]  Fax: [phone]    Jerald Woo  : 1975  PCP: Leonid Skinner MD  3/10/2023    ELECTROMYOGRAPHY AND NERVE CONDUCTION STUDIES    Daniel Colby Jason was referred by Dr. Abraham Stevens for electrodiagnostic evaluation of numbness and tingling of BUE. NCV & EMG Findings:  Evaluation of the left median (APB) motor nerve showed prolonged distal onset latency (6.4 ms). The right median (APB) motor nerve showed prolonged distal onset latency (5.7 ms) and reduced amplitude (3.6 mV). The left ulnar (ADM) motor nerve showed decreased conduction velocity (Bel Elbow-Wrist, 49 m/s). The right ulnar (ADM) motor nerve showed reduced amplitude (7.8 mV). The left median sensory and the right median sensory nerves showed prolonged distal onset latency (L5.5, R4.3 ms), prolonged distal peak latency (L6.3, R5.1 ms), and reduced amplitude (L2, R4 µV). All remaining nerves (as indicated in the following tables) were within normal limits. INTERPRETATION  This is an abnormal electrodiagnostic examination. These findings may be consistent with:  Moderate median mononeuropathy at bilateral wrists (L>R) (carpal tunnel syndrome)     There are no electrodiagnostic findings consistent with cervical radiculopathy, brachial plexopathy, myopathy, polyneuropathy or any other mononeuropathy. CLINICAL INTERPRETATION  His electrodiagnostic findings of bilateral median mononeuropathy at the wrists appear to be consistent with her bilateral hand symptoms. HISTORY OF PRESENT ILLNESS  Stu Ponce is a 50 y.o. male. Pt presents today with BUE EMG evaluation for numbness and tingling of both hands (L>R), especially in thumbs and index fingers. Notes sxs occur intermittently, such as when holding his phone or tablet for prolonged periods of time.     PAST MEDICAL HISTORY   Past Medical History:   Diagnosis Date    Hypertension        History reviewed. No pertinent surgical history. Sher[facility] [place] [facility] [place] [place] MEDICATIONS    Current Outpatient Medications   Medication Sig Dispense Refill    metFORMIN (GLUCOPHAGE-XR) 500 MG extended release tablet metFORMIN HCl  MG      losartan-hydroCHLOROthiazide (HYZAAR) 100-12.5 MG per tablet Take 1 tablet by mouth daily      amLODIPine (NORVASC) 10 MG tablet 0NE TAB BY MOUTH EVERY AM FOR HIGH BLOOD PRESSURE   THIS IS A DOSE INCREASE      aspirin 81 MG EC tablet TAKE 1 TABLET BY MOUTH EVERY DAY      atorvastatin (LIPITOR) 40 MG tablet ceived the following from [facility]: Outside name: atorvastatin (LIPITOR) 40 mg tablet      celecoxib (CELEBREX) 200 MG capsule ceived the following from [facility]: Outside name: celecoxib (CELEBREX) 200 mg capsule      fluticasone (FLONASE) 50 MCG/ACT nasal spray USE 1 SPRAY IN BOTH NOSTRILS TWICE A DAY      hydroCHLOROthiazide (HYDRODIURIL) 25 MG tablet Take 25 mg by mouth daily      nebivolol (BYSTOLIC) 20 MG TABS tablet ceived the following from [facility]: Outside name: Bystolic 20 mg tablet      nebivolol (BYSTOLIC) 5 MG tablet Take 20 mg by mouth daily      telmisartan (MICARDIS) 80 MG tablet Take 80 mg by mouth daily       No current facility-administered medications for this visit.         ALLERGIES  No Known Allergies       SOCIAL HISTORY    Social History     Socioeconomic History    Marital status:      Spouse name: None    Number of children: None    Years of education: None    Highest education level: None   Tobacco Use    Smoking status: Never    Smokeless tobacco: Never   Substance and Sexual Activity    Alcohol use: Never    Drug use: Never       FAMILY HISTORY  Family History   Problem Relation Age of Onset    No Known Problems Mother     Diabetes Father          PHYSICAL EXAMINATION  /75   Pulse 70   Temp 98.2 °F (36.8 °C) (Temporal)   Resp 18   Ht 6' 1\" (1.854 m)   Wt (!) 321 lb 3.2 oz (145.7 kg)   SpO2 95%   BMI 42.38 kg/m²        AMB PAIN ASSESSMENT 3/10/2023   Location of Pain Arm   Location Modifiers Right;Left   Severity of Pain 0   Quality of Pain Other (Comment)   Duration of Pain Other (Comment)   Frequency of Pain Intermittent   Aggravating Factors Other (Comment)   Limiting Behavior Some   Relieving Factors Rest   Result of Injury No           Constitutional:  Well developed, well nourished, in no acute distress. Psychiatric: Affect and mood are appropriate. Integumentary: No rashes or abrasions noted on exposed areas. SPINE/MUSCULOSKELETAL EXAM    On brief examination: None.       NCV & EMG Findings:  NCS+  Motor Nerve Results      Latency Amplitude F-Lat Segment Distance CV Comment   Site (ms) Norm (mV) Norm (ms)  (cm) (m/s) Norm    Left Median (APB) Motor   Wrist 6.4  < 4.6 5.3  > 4.2  Wrist-APB 8      Elbow 11.8 - 4.9 -  Elbow-Wrist 26 48  > 47    Right Median (APB) Motor   Wrist 5.7  < 4.6 3.6  > 4.2  Wrist-APB 8      Elbow 11.4 - 3.1 -  Elbow-Wrist 27.5 48  > 47    Left Ulnar (ADM) Motor   Wrist 3.0  < 3.7 8.0  > 7.9  Wrist-ADM 8      Bel Elbow 7.5 - 7.3 -  Bel Elbow-Wrist 22 49  > 52    Abv Elbow 9.0 - 7.5 -  Abv Elbow-Bel Elbow 10 67  > 43    Right Ulnar (ADM) Motor   Wrist 2.8  < 3.7 7.8  > 7.9  Wrist-ADM 8      Bel Elbow 6.9 - 6.8 -  Bel Elbow-Wrist 22.5 55  > 52    Abv Elbow 8.9 - 7.4 -  Abv Elbow-Bel Elbow 10 50  > 43      Sensory Sites       Latency (Onset) Latency (Peak)  Amplitude (O-P) Segment Distance CV (Onset) Comment   Site ms Norm (ms) Norm µV Norm  mm m/s Norm    Left Median Sensory   Wrist-Dig II 5.5  < 3.3 6.3  < 4.0 2  > 11 Wrist-Dig II 14 25 -    Right Median Sensory   Wrist-Dig II 4.3  < 3.3 5.1  < 4.0 4  > 11 Wrist-Dig II 14 33 -    Left Radial Sensory   Forearm-Wrist 1.30  < 2.2 1.83  < 2.8 23  > 7 Forearm-Wrist 10 77 -    Right Radial Sensory   Forearm-Wrist 1.35  < 2.2 1.95  < 2.8 26  > 7 Forearm-Wrist 10 74 -    Left Ulnar Sensory   Wrist-Dig V 2.4  < 3.1 2.9  < 4.0 25  > 11 Wrist-Dig V 14 58 -    Right Ulnar Sensory   Wrist-Dig V 1.95  < 3.1 3.0  < 4.0 14  > 11 Wrist-Dig V 14 72 -      EMG+     Side Muscle Nerve Root Ins Act Fibs Psw Fascics Other Amp Dur Poly Recrt Activation Comment Misc   Right Biceps Musculocut C5-C6 Nml Nml Nml Nml 0 Nml Nml 0 Nml Nml     Right Triceps Radial C6-C8 Nml Nml Nml Nml 0 Nml Nml 0 Nml Nml     Right Pronator Teres Median C6-C7 Nml Nml Nml Nml 0 Nml Nml 0 Nml Nml     Right FDI Median,  Ulnar C8-T1 Nml Nml Nml Nml 0 Nml Nml 0 Nml Nml     Right APB Median C8-T1 Nml Nml Nml Nml 0 Nml Nml 0 Nml Nml     Left Biceps Musculocut C5-C6 Nml Nml Nml Nml 0 Nml Nml 0 Nml Nml     Left Triceps Radial C6-C8 Nml Nml Nml Nml 0 Nml Nml 0 Nml Nml     Left Pronator Teres Median C6-C7 Nml Nml Nml Nml 0 Nml Nml 0 Nml Nml     Left FDI Median,  Ulnar C8-T1 Nml Nml Nml Nml 0 Nml Nml 0 Nml Nml     Left APB Median C8-T1 Nml Nml Nml Nml 0 Nml Nml 0 Nml Nml             Waveforms:    Motor                Sensory                          [facility]  OFFICE PROCEDURE PROGRESS NOTE        Chart reviewed for the following:   Jaimee PEREZ, have reviewed the History, Physical and updated the Allergic reactions for [address] performed immediately prior to start of procedure:   Jaimee PEREZ, have performed the following reviews on Kalli Herbert . prior to the start of the procedure:            * Patient was identified by name and date of birth   * Agreement on procedure being performed was verified  * Risks and Benefits explained to the patient  * Procedure site verified and marked as necessary  * Patient was positioned for comfort  * Consent was signed and verified     Time: 4:51 PM     Date of procedure: 3/10/2023    Procedure performed by:  Nikkie Jackson MD    Provider accompanied by: Justa.     Patient accompanied by another individual: No    How tolerated by patient: tolerated the procedure well with no complications    Post Procedural Pain Scale: 0 - No Hurt    Comments: none    Written by Nayeli Carbajal as dictated by Kristin Gomez MD

## 2023-04-24 ENCOUNTER — OFFICE VISIT (OUTPATIENT)
Age: 48
End: 2023-04-24
Payer: COMMERCIAL

## 2023-04-24 VITALS — WEIGHT: 315 LBS | BODY MASS INDEX: 41.75 KG/M2 | HEIGHT: 73 IN

## 2023-04-24 DIAGNOSIS — G56.03 BILATERAL CARPAL TUNNEL SYNDROME: Primary | ICD-10-CM

## 2023-04-24 PROCEDURE — 99214 OFFICE O/P EST MOD 30 MIN: CPT | Performed by: ORTHOPAEDIC SURGERY

## 2023-04-24 NOTE — PROGRESS NOTES
-   PCB Median abn - - SC Tinel - -   Radial Tinel - - IC Comp. - -   Digital Tinel - - IC Tinel - -   Radial 2-Pt WNL WNL Ulnar 2-Pt WNL WNL     Radial Pulse: 2+  Capillary Refill: < 2 sec  Nikos: Not Performed  [employer]: Not Performed    NCV & EMG Findings:  Evaluation of the left median (APB) motor nerve showed prolonged distal onset latency (6.4 ms). The right median (APB) motor nerve showed prolonged distal onset latency (5.7 ms) and reduced amplitude (3.6 mV). The left ulnar (ADM) motor nerve showed decreased conduction velocity ([place] Elbow-Wrist, 49 m/s). The right ulnar (ADM) motor nerve showed reduced amplitude (7.8 mV). The left median sensory and the right median sensory nerves showed prolonged distal onset latency (L5.5, R4.3 ms), prolonged distal peak latency (L6.3, R5.1 ms), and reduced amplitude (L2, R4 µV). All remaining nerves (as indicated in the following tables) were within normal limits. INTERPRETATION  This is an abnormal electrodiagnostic examination. These findings may be consistent with:  Moderate median mononeuropathy at bilateral wrists (L>R) (carpal tunnel syndrome)      There are no electrodiagnostic findings consistent with cervical radiculopathy, brachial plexopathy, myopathy, polyneuropathy or any other mononeuropathy. CLINICAL INTERPRETATION  His electrodiagnostic findings of bilateral median mononeuropathy at the wrists appear to be consistent with her bilateral hand symptoms. Imaging:     None indicated      Impression     Diagnosis Orders   1. Bilateral carpal tunnel syndrome              Plan:     Discussed possibility of surgery versus injection versus continue to observe nighttime versus this patient is improving with night braces and he has had resolution of his trigger fingers, we will observe for now. Patient has stated he will do back in for possible injection versus surgical discussion if needed.     Return if symptoms worsen or fail to improve,